# Patient Record
Sex: MALE | Race: WHITE | NOT HISPANIC OR LATINO | Employment: OTHER | URBAN - METROPOLITAN AREA
[De-identification: names, ages, dates, MRNs, and addresses within clinical notes are randomized per-mention and may not be internally consistent; named-entity substitution may affect disease eponyms.]

---

## 2018-08-23 ENCOUNTER — OFFICE VISIT (OUTPATIENT)
Dept: SLEEP MEDICINE | Facility: HOSPITAL | Age: 48
End: 2018-08-23
Attending: INTERNAL MEDICINE

## 2018-08-23 VITALS
WEIGHT: 245.4 LBS | TEMPERATURE: 98.7 F | SYSTOLIC BLOOD PRESSURE: 137 MMHG | HEART RATE: 91 BPM | OXYGEN SATURATION: 95 % | DIASTOLIC BLOOD PRESSURE: 92 MMHG | BODY MASS INDEX: 30.51 KG/M2 | HEIGHT: 75 IN

## 2018-08-23 DIAGNOSIS — E66.9 OBESITY (BMI 30.0-34.9): ICD-10-CM

## 2018-08-23 DIAGNOSIS — R09.81 NASAL CONGESTION: ICD-10-CM

## 2018-08-23 DIAGNOSIS — R06.83 SNORING: Primary | ICD-10-CM

## 2018-08-23 DIAGNOSIS — G47.33 OSA (OBSTRUCTIVE SLEEP APNEA): ICD-10-CM

## 2018-08-23 PROCEDURE — G0463 HOSPITAL OUTPT CLINIC VISIT: HCPCS

## 2018-08-30 ENCOUNTER — HOSPITAL ENCOUNTER (OUTPATIENT)
Dept: SLEEP MEDICINE | Facility: HOSPITAL | Age: 48
Discharge: HOME | End: 2018-08-30
Attending: INTERNAL MEDICINE | Admitting: INTERNAL MEDICINE

## 2018-08-30 DIAGNOSIS — G47.33 OSA (OBSTRUCTIVE SLEEP APNEA): ICD-10-CM

## 2018-08-30 PROCEDURE — 95806 SLEEP STUDY UNATT&RESP EFFT: CPT

## 2018-09-18 ENCOUNTER — OFFICE VISIT (OUTPATIENT)
Dept: SLEEP MEDICINE | Facility: HOSPITAL | Age: 48
End: 2018-09-18
Attending: INTERNAL MEDICINE

## 2018-09-18 VITALS
SYSTOLIC BLOOD PRESSURE: 132 MMHG | OXYGEN SATURATION: 96 % | DIASTOLIC BLOOD PRESSURE: 90 MMHG | HEART RATE: 76 BPM | WEIGHT: 246.6 LBS | BODY MASS INDEX: 30.66 KG/M2 | HEIGHT: 75 IN

## 2018-09-18 DIAGNOSIS — G47.33 OBSTRUCTIVE SLEEP APNEA, ADULT: Primary | ICD-10-CM

## 2018-09-18 DIAGNOSIS — E66.9 OBESITY (BMI 30.0-34.9): ICD-10-CM

## 2018-09-18 PROCEDURE — G0463 HOSPITAL OUTPT CLINIC VISIT: HCPCS

## 2018-12-11 ENCOUNTER — OFFICE VISIT (OUTPATIENT)
Dept: SLEEP MEDICINE | Facility: HOSPITAL | Age: 48
End: 2018-12-11
Attending: INTERNAL MEDICINE

## 2018-12-11 VITALS
SYSTOLIC BLOOD PRESSURE: 120 MMHG | BODY MASS INDEX: 31.58 KG/M2 | DIASTOLIC BLOOD PRESSURE: 82 MMHG | HEIGHT: 75 IN | OXYGEN SATURATION: 96 % | HEART RATE: 76 BPM | WEIGHT: 254 LBS

## 2018-12-11 DIAGNOSIS — E66.9 OBESITY (BMI 30.0-34.9): ICD-10-CM

## 2018-12-11 DIAGNOSIS — G47.33 OBSTRUCTIVE SLEEP APNEA, ADULT: Primary | ICD-10-CM

## 2018-12-11 PROCEDURE — G0463 HOSPITAL OUTPT CLINIC VISIT: HCPCS

## 2019-12-03 ENCOUNTER — OFFICE VISIT (OUTPATIENT)
Dept: SLEEP MEDICINE | Facility: HOSPITAL | Age: 49
End: 2019-12-03
Attending: INTERNAL MEDICINE

## 2019-12-03 DIAGNOSIS — G47.33 OBSTRUCTIVE SLEEP APNEA, ADULT: Primary | ICD-10-CM

## 2019-12-03 PROCEDURE — G0463 HOSPITAL OUTPT CLINIC VISIT: HCPCS

## 2020-12-08 ENCOUNTER — OFFICE VISIT (OUTPATIENT)
Dept: SLEEP MEDICINE | Facility: HOSPITAL | Age: 50
End: 2020-12-08

## 2020-12-08 VITALS
BODY MASS INDEX: 31.71 KG/M2 | WEIGHT: 255 LBS | HEIGHT: 75 IN | OXYGEN SATURATION: 98 % | HEART RATE: 78 BPM | SYSTOLIC BLOOD PRESSURE: 140 MMHG | DIASTOLIC BLOOD PRESSURE: 78 MMHG

## 2020-12-08 DIAGNOSIS — G47.33 OBSTRUCTIVE SLEEP APNEA, ADULT: Primary | ICD-10-CM

## 2020-12-08 PROCEDURE — G0463 HOSPITAL OUTPT CLINIC VISIT: HCPCS

## 2021-12-14 ENCOUNTER — APPOINTMENT (OUTPATIENT)
Dept: SLEEP MEDICINE | Facility: HOSPITAL | Age: 51
End: 2021-12-14

## 2022-01-10 ENCOUNTER — OFFICE VISIT (OUTPATIENT)
Dept: SLEEP MEDICINE | Facility: HOSPITAL | Age: 52
End: 2022-01-10

## 2022-01-10 VITALS
DIASTOLIC BLOOD PRESSURE: 88 MMHG | OXYGEN SATURATION: 96 % | HEART RATE: 75 BPM | SYSTOLIC BLOOD PRESSURE: 131 MMHG | WEIGHT: 253 LBS | BODY MASS INDEX: 31.46 KG/M2 | HEIGHT: 75 IN

## 2022-01-10 DIAGNOSIS — G47.33 OBSTRUCTIVE SLEEP APNEA, ADULT: Primary | ICD-10-CM

## 2022-01-10 PROCEDURE — G0463 HOSPITAL OUTPT CLINIC VISIT: HCPCS

## 2023-01-09 ENCOUNTER — OFFICE VISIT (OUTPATIENT)
Dept: SLEEP MEDICINE | Facility: HOSPITAL | Age: 53
End: 2023-01-09
Payer: COMMERCIAL

## 2023-01-09 VITALS
HEART RATE: 80 BPM | BODY MASS INDEX: 32.2 KG/M2 | DIASTOLIC BLOOD PRESSURE: 79 MMHG | SYSTOLIC BLOOD PRESSURE: 125 MMHG | WEIGHT: 259 LBS | HEIGHT: 75 IN | OXYGEN SATURATION: 97 %

## 2023-01-09 PROCEDURE — G0463 HOSPITAL OUTPT CLINIC VISIT: HCPCS

## 2024-01-22 ENCOUNTER — OFFICE VISIT (OUTPATIENT)
Dept: SLEEP MEDICINE | Facility: HOSPITAL | Age: 54
End: 2024-01-22
Payer: COMMERCIAL

## 2024-01-22 VITALS
WEIGHT: 252 LBS | SYSTOLIC BLOOD PRESSURE: 152 MMHG | BODY MASS INDEX: 31.33 KG/M2 | DIASTOLIC BLOOD PRESSURE: 80 MMHG | HEART RATE: 74 BPM | HEIGHT: 75 IN | OXYGEN SATURATION: 96 %

## 2024-01-22 DIAGNOSIS — G47.33 OBSTRUCTIVE SLEEP APNEA, ADULT: Primary | ICD-10-CM

## 2024-01-22 PROCEDURE — G0463 HOSPITAL OUTPT CLINIC VISIT: HCPCS

## 2025-02-03 ENCOUNTER — OFFICE VISIT (OUTPATIENT)
Dept: SLEEP MEDICINE | Facility: HOSPITAL | Age: 55
End: 2025-02-03
Payer: COMMERCIAL

## 2025-02-03 VITALS
SYSTOLIC BLOOD PRESSURE: 145 MMHG | OXYGEN SATURATION: 95 % | HEIGHT: 75 IN | BODY MASS INDEX: 31.83 KG/M2 | DIASTOLIC BLOOD PRESSURE: 92 MMHG | HEART RATE: 78 BPM | WEIGHT: 256 LBS

## 2025-02-03 DIAGNOSIS — G47.33 OBSTRUCTIVE SLEEP APNEA, ADULT: Primary | ICD-10-CM

## 2025-02-03 PROCEDURE — G0463 HOSPITAL OUTPT CLINIC VISIT: HCPCS

## 2025-02-03 NOTE — PROGRESS NOTES
"                      [facility]                          Chief Complaint:   F/up TOBY & HTN    History of present illness:   Subjective     TOBY:  He has severe disease on HST 8/31/18 (KRYSTIAN: 42/h).  He has been on CPAP therapy since then.    He uses his CPAP regularly and denies issues with it.      Mask: Hybrid FFM which does fit well.   DME: [facility]    ESS: Total score: 0     Environmental allergy: He continues to experience congestion and postnasal drip and he is currently using Flonase and Zyrtec. No side effects.    HTN: On Losartan. BP is more controlled at home.     REVIEW OF SYSTEMS:   HEENT: No nasal congestion or postnasal drip   CARDIOVASCULAR: No chest pain, chest pressure or chest discomfort. No palpitations or edema.   RESPIRATORY: No shortness of breath, cough or sputum.   GASTROINTESTINAL: No abdominal bloating or reflux   NEUROLOGICAL/PSYCHOATRY: No depression nor anxiety    Past Medical History:  PMH:  Environmental allergy     PSH:  Adenoidectomy in teenage     Medications:  Zyrtec  Montelukast      Social history:  No change since last visit.  He does not smoke.  He drinks alcohol occasionally    Objective   Vital Signs:  Vitals:    02/03/25 1132   BP: 145/92   Pulse: 78   SpO2: 95%   Weight: 116 kg (256 lb)   Height: 190.5 cm (75\")     Body mass index is 32 kg/m².          Physical Exam:   General Appearance:    Alert, cooperative, in no acute distress   ENMT:  Freidman score 2.  Mallampati 3., narrow distance in between the posterior pharyngeal pillars   Neck:  Large. Trachea midline. No thyromegaly.   Lungs:     Clear to auscultation,respirations regular, even and  unlabored    Heart:    Regular rhythm and normal rate, normal S1 and S2, no  murmur.   Abdomen:     Obese.  Soft.  No tenderness.  No HSM    Neuro:   Conscious, alert, oriented x3. Appropriate mood and affect.    Extremities:   Moves all extremities well, no edema, no cyanosis, no   redness              Diagnostic " data:  HST was performed on: 8/31/18  total KRYSTIAN: 42 /h. Supine time was 191 minutes resulting in Supine KRYSTIAN: 63 /h  TOMMY= 41 events/h; Mika SpO2= 78 % and hypoxic burden: 123 min.    CPAP download showed:  Date: 90 days  Usage (days): 100 %  Days used>4h: 93 %  AHI: 5.3/h  Leak: 11 min  Usage: 6h and 9 min  P 90% : 10  Auto CPAP: 5-15 cm H2O    Lab Results   Component Value Date    HGBA1C 5.6 08/26/2024     Triglycerides   Date Value Ref Range Status   07/22/2020 163 (H) 0 - 149 mg/dL Final     HDL Cholesterol   Date Value Ref Range Status   07/22/2020 39 (L) >39 mg/dL Final     Hemoglobin   Date Value Ref Range Status   08/26/2024 14 13.5 - 17.5 g/dL Final     Total CO2   Date Value Ref Range Status   10/19/2021 19 (L) 22 - 30 mmol/L Final       Assessment   Severe TOBY, on CPAP  Obesity (BMI = 32)   HTN  Allergy to environmental factors          PLAN:  Discussed the result of the download.   Patient is compliant with therapy and clinically benefit from treatment.  Patient was encouraged to continue using PAP.  Refill supplies.    Counseled for weight loss.  Encouraged to exercise regularly and cut down on carbohydrates.  Discussed that losing weight may decrease the severity of sleep apnea and obviate the need of CPAP therapy.    Discussed the association between obstructive sleep apnea and cardiovascular disease including HTN and the beneficial effect of Pap therapy in reducing the risk of major cardiovascular events.      RTC 1 year.     Time: 22 min    This note was dictated utilizing [place] dictation